# Patient Record
Sex: MALE | Race: BLACK OR AFRICAN AMERICAN | NOT HISPANIC OR LATINO | Employment: UNEMPLOYED | URBAN - METROPOLITAN AREA
[De-identification: names, ages, dates, MRNs, and addresses within clinical notes are randomized per-mention and may not be internally consistent; named-entity substitution may affect disease eponyms.]

---

## 2019-12-29 ENCOUNTER — NURSE TRIAGE (OUTPATIENT)
Dept: ADMINISTRATIVE | Facility: CLINIC | Age: 44
End: 2019-12-29

## 2019-12-29 ENCOUNTER — HOSPITAL ENCOUNTER (EMERGENCY)
Facility: HOSPITAL | Age: 44
Discharge: HOME | End: 2019-12-29
Attending: EMERGENCY MEDICINE
Payer: MEDICAID

## 2019-12-29 VITALS
HEART RATE: 65 BPM | SYSTOLIC BLOOD PRESSURE: 134 MMHG | BODY MASS INDEX: 21.47 KG/M2 | HEIGHT: 70 IN | TEMPERATURE: 97 F | OXYGEN SATURATION: 97 % | DIASTOLIC BLOOD PRESSURE: 76 MMHG | RESPIRATION RATE: 16 BRPM | WEIGHT: 150 LBS

## 2019-12-29 DIAGNOSIS — S06.5XAA SDH (SUBDURAL HEMATOMA): Primary | ICD-10-CM

## 2019-12-29 PROCEDURE — 99284 EMERGENCY DEPT VISIT MOD MDM: CPT | Mod: 25

## 2019-12-29 PROCEDURE — 99285 EMERGENCY DEPT VISIT HI MDM: CPT | Mod: ,,, | Performed by: EMERGENCY MEDICINE

## 2019-12-29 PROCEDURE — 25000003 PHARM REV CODE 250: Performed by: EMERGENCY MEDICINE

## 2019-12-29 PROCEDURE — 99285 PR EMERGENCY DEPT VISIT,LEVEL V: ICD-10-PCS | Mod: ,,, | Performed by: EMERGENCY MEDICINE

## 2019-12-29 RX ORDER — HYDROCODONE BITARTRATE AND ACETAMINOPHEN 5; 325 MG/1; MG/1
1 TABLET ORAL EVERY 6 HOURS PRN
Qty: 12 TABLET | Refills: 0 | Status: SHIPPED | OUTPATIENT
Start: 2019-12-29

## 2019-12-29 RX ORDER — ACETAMINOPHEN 325 MG/1
650 TABLET ORAL
Status: COMPLETED | OUTPATIENT
Start: 2019-12-29 | End: 2019-12-29

## 2019-12-29 RX ADMIN — ACETAMINOPHEN 650 MG: 325 TABLET ORAL at 08:12

## 2019-12-29 NOTE — PLAN OF CARE
Imaging reviewed    Small right convexity SDH with no midline shift.  Will plan to repeat CT head at noon and if stable can fu in NSGY clinic with repeat CT head at that time.
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
## 2019-12-29 NOTE — TELEPHONE ENCOUNTER
Reason for Disposition   [1] ACUTE NEURO SYMPTOM AND [2] present now  (DEFINITION: difficult to awaken OR confused thinking and talking OR slurred speech OR weakness of arms OR unsteady walking)    Protocols used: HEAD INJURY-A-AH
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
## 2019-12-29 NOTE — ASSESSMENT & PLAN NOTE
44 y.o. male who presents as a transfer from OSH for evaluation of a thin right temporoparietal SDH.    - Repeat CTH at noon today to ensure stability   - Recommend Keppra 500 BID x7 days  - HOB >30  - NPO  - Please page NSGY on call when repeat CTH is complete.  - If scan is stable from prior, patient will follow up in clinic in 2 weeks with a repeat CTH.  - Pain management per ED  - No acute neurosurgical intervention at this time. Thank you for this consult. Please contact us with any additional questions.
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
## 2019-12-29 NOTE — SUBJECTIVE & OBJECTIVE
Medications:  Continuous Infusions:  Scheduled Meds:  PRN Meds:     Review of Systems   Constitutional: Negative for chills and fever.   HENT: Negative for sinus pressure and sinus pain.    Eyes: Negative for photophobia and visual disturbance.   Respiratory: Negative for cough and shortness of breath.    Cardiovascular: Negative for chest pain and palpitations.   Gastrointestinal: Negative for constipation, diarrhea, nausea and vomiting.   Endocrine: Negative for polydipsia and polyuria.   Genitourinary: Negative for difficulty urinating, frequency and urgency.   Musculoskeletal: Negative for back pain, myalgias, neck pain and neck stiffness.   Skin: Negative for color change and rash.   Neurological: Positive for headaches. Negative for tremors, seizures, syncope, facial asymmetry, speech difficulty, weakness, light-headedness and numbness.   Psychiatric/Behavioral: Negative for agitation and sleep disturbance.     Objective:     Weight: 68 kg (150 lb)  Body mass index is 21.52 kg/m².  Vital Signs (Most Recent):  Temp: 97.1 °F (36.2 °C) (12/29/19 0747)  Pulse: (!) 48 (12/29/19 1047)  Resp: 16 (12/29/19 0902)  BP: 112/69 (12/29/19 1047)  SpO2: 95 % (12/29/19 1047) Vital Signs (24h Range):  Temp:  [97.1 °F (36.2 °C)] 97.1 °F (36.2 °C)  Pulse:  [48-94] 48  Resp:  [16-17] 16  SpO2:  [95 %-99 %] 95 %  BP: (112-153)/(66-93) 112/69       Physical Exam:    Constitutional: He appears well-developed and well-nourished.     Eyes: Pupils are equal, round, and reactive to light. Conjunctivae and EOM are normal.     Cardiovascular: Normal rate, regular rhythm and normal pulses.     Abdominal: Soft.     Psych/Behavior: He is alert. He is oriented to person, place, and time. He has a normal mood and affect.     Musculoskeletal:   Appropriate tone, no evidence of spasm      Neurological:   GCS [id number]  CN II-XII grossly intact  Strength 5/5 throughout  Sensation intact to light touch       Significant Labs:  Recent Labs   Lab  12/29/19  0620         K 4.0      CO2 24   BUN 16   CREATININE 0.9   CALCIUM 9.0     Recent Labs   Lab 12/29/19  0620   WBC 9.90   HGB 16.5   HCT 48.3        Recent Labs   Lab 12/29/19  0620   INR 1.0   APTT 29.6     Microbiology Results (last 7 days)     ** No results found for the last 168 hours. **

## 2019-12-29 NOTE — DISCHARGE INSTRUCTIONS
Do not drink or drive on this medication.  This medication puts you at risk for a fall.  Please use a cane or walker as needed.     Our goal in the emergency department is to always give you outstanding care and exceptional service. You may receive a survey by mail or e-mail in the next week regarding your experience in our ED. We would greatly appreciate your completing and returning the survey. Your feedback provides us with a way to recognize our staff who give very good care and it helps us learn how to improve when your experience was below our aspiration of excellence.

## 2019-12-30 ENCOUNTER — TELEPHONE (OUTPATIENT)
Dept: NEUROSURGERY | Facility: CLINIC | Age: 44
End: 2019-12-30

## 2019-12-30 NOTE — TELEPHONE ENCOUNTER
----- Message from Azucena An MA sent at 12/30/2019  2:22 PM CST -----  Contact: patient      ----- Message -----  From: Delia Jimenez  Sent: 12/30/2019   9:54 AM CST  To: [facility] Neurosurgery Clinical Support    Please call above patient at [phone] need to schedule appointment waiting on a call back also have questions about medication he was given in emergency room thanks.

## 2019-12-31 ENCOUNTER — TELEPHONE (OUTPATIENT)
Dept: NEUROSURGERY | Facility: CLINIC | Age: 44
End: 2019-12-31

## 2019-12-31 DIAGNOSIS — S06.5XAA SDH (SUBDURAL HEMATOMA): Primary | ICD-10-CM

## 2025-01-08 ENCOUNTER — NURSE TRIAGE (OUTPATIENT)
Dept: ADMINISTRATIVE | Facility: CLINIC | Age: 50
End: 2025-01-08
Payer: MEDICAID

## 2025-01-08 NOTE — TELEPHONE ENCOUNTER
Discharged 1 week ago with DM type 2. He was not able to  meds and supplies for insulin intake and glucose monitoring until today. His BS was 300. He administered Humalog 8 units and he has metformin tablets 500 mg.   Reason for Disposition   Acting confused (e.g., disoriented, slurred speech)    Additional Information   Negative: Unconscious or difficult to awaken    Protocols used: Diabetes - High Blood Sugar-A-OH